# Patient Record
Sex: FEMALE | Race: BLACK OR AFRICAN AMERICAN | URBAN - METROPOLITAN AREA
[De-identification: names, ages, dates, MRNs, and addresses within clinical notes are randomized per-mention and may not be internally consistent; named-entity substitution may affect disease eponyms.]

---

## 2023-12-28 ENCOUNTER — TELEPHONE (OUTPATIENT)
Age: 33
End: 2023-12-28

## 2023-12-28 NOTE — TELEPHONE ENCOUNTER
Rosalee price  [id number]  Referral to specialist needs to go to the insurance company  [facility] specialist needs the visits   to and from date and number of visits and lengtth of time   Jayjay marinelli md     Plz call [phone]

## 2023-12-28 NOTE — TELEPHONE ENCOUNTER
Message given to Roxana, ben.  Stated she spoke with pt earlier, no authorization needed per [place]